# Patient Record
Sex: FEMALE | Race: WHITE | Employment: FULL TIME | URBAN - METROPOLITAN AREA
[De-identification: names, ages, dates, MRNs, and addresses within clinical notes are randomized per-mention and may not be internally consistent; named-entity substitution may affect disease eponyms.]

---

## 2024-01-25 ENCOUNTER — HOSPITAL ENCOUNTER (EMERGENCY)
Age: 19
Discharge: HOME | End: 2024-01-25
Attending: EMERGENCY MEDICINE
Payer: COMMERCIAL

## 2024-01-25 ENCOUNTER — APPOINTMENT (OUTPATIENT)
Dept: CT IMAGING | Age: 19
End: 2024-01-25
Payer: COMMERCIAL

## 2024-01-25 VITALS
SYSTOLIC BLOOD PRESSURE: 119 MMHG | OXYGEN SATURATION: 100 % | HEIGHT: 68 IN | DIASTOLIC BLOOD PRESSURE: 81 MMHG | WEIGHT: 135 LBS | TEMPERATURE: 98.8 F | BODY MASS INDEX: 20.46 KG/M2 | HEART RATE: 77 BPM | RESPIRATION RATE: 18 BRPM

## 2024-01-25 DIAGNOSIS — R10.84 GENERALIZED ABDOMINAL PAIN: Primary | ICD-10-CM

## 2024-01-25 LAB
ALBUMIN SERPL-MCNC: 4.6 G/DL (ref 3.5–5.2)
ALBUMIN/GLOB SERPL: 1.7 {RATIO} (ref 1–2.5)
ALP SERPL-CCNC: 71 U/L (ref 35–104)
ALT SERPL-CCNC: 8 U/L (ref 5–33)
ANION GAP SERPL CALCULATED.3IONS-SCNC: 7 MMOL/L (ref 9–17)
AST SERPL-CCNC: 15 U/L
BACTERIA URNS QL MICRO: ABNORMAL
BASOPHILS # BLD: 0 K/UL (ref 0–0.2)
BASOPHILS NFR BLD: 1 % (ref 0–2)
BILIRUB DIRECT SERPL-MCNC: <0.1 MG/DL
BILIRUB INDIRECT SERPL-MCNC: NORMAL MG/DL (ref 0–1)
BILIRUB SERPL-MCNC: 0.3 MG/DL (ref 0.3–1.2)
BILIRUB UR QL STRIP: NEGATIVE
BUN SERPL-MCNC: 16 MG/DL (ref 6–20)
CALCIUM SERPL-MCNC: 9.3 MG/DL (ref 8.6–10.4)
CHARACTER UR: ABNORMAL
CHLORIDE SERPL-SCNC: 107 MMOL/L (ref 98–107)
CLARITY UR: CLEAR
CO2 SERPL-SCNC: 25 MMOL/L (ref 20–31)
COLOR UR: YELLOW
CREAT SERPL-MCNC: 0.8 MG/DL (ref 0.5–0.9)
EOSINOPHIL # BLD: 0.2 K/UL (ref 0–0.4)
EOSINOPHILS RELATIVE PERCENT: 2 % (ref 1–4)
EPI CELLS #/AREA URNS HPF: ABNORMAL /HPF (ref 0–5)
ERYTHROCYTE [DISTWIDTH] IN BLOOD BY AUTOMATED COUNT: 14 % (ref 12.5–15.4)
GFR SERPL CREATININE-BSD FRML MDRD: >60 ML/MIN/1.73M2
GLUCOSE SERPL-MCNC: 77 MG/DL (ref 70–99)
GLUCOSE UR STRIP-MCNC: NEGATIVE MG/DL
HCG SERPL QL: NEGATIVE
HCT VFR BLD AUTO: 38.4 % (ref 36–46)
HGB BLD-MCNC: 12.9 G/DL (ref 12–16)
HGB UR QL STRIP.AUTO: NEGATIVE
KETONES UR STRIP-MCNC: NEGATIVE MG/DL
LEUKOCYTE ESTERASE UR QL STRIP: ABNORMAL
LIPASE SERPL-CCNC: 42 U/L (ref 13–60)
LYMPHOCYTES NFR BLD: 2.5 K/UL (ref 1.2–5.2)
LYMPHOCYTES RELATIVE PERCENT: 36 % (ref 25–45)
MCH RBC QN AUTO: 28.3 PG (ref 26–34)
MCHC RBC AUTO-ENTMCNC: 33.7 G/DL (ref 31–37)
MCV RBC AUTO: 84.1 FL (ref 80–100)
MONOCYTES NFR BLD: 0.5 K/UL (ref 0.1–1.4)
MONOCYTES NFR BLD: 7 % (ref 2–8)
NEUTROPHILS NFR BLD: 54 % (ref 34–64)
NEUTS SEG NFR BLD: 3.7 K/UL (ref 1.8–8)
NITRITE UR QL STRIP: NEGATIVE
PH UR STRIP: 6 [PH] (ref 5–8)
PLATELET # BLD AUTO: 274 K/UL (ref 140–450)
PMV BLD AUTO: 8.6 FL (ref 6–12)
POTASSIUM SERPL-SCNC: 4 MMOL/L (ref 3.7–5.3)
PROT SERPL-MCNC: 7.3 G/DL (ref 6.4–8.3)
PROT UR STRIP-MCNC: NEGATIVE MG/DL
RBC # BLD AUTO: 4.56 M/UL (ref 4–5.2)
RBC #/AREA URNS HPF: ABNORMAL /HPF (ref 0–2)
SODIUM SERPL-SCNC: 139 MMOL/L (ref 135–144)
SP GR UR STRIP: 1.02 (ref 1–1.03)
UROBILINOGEN UR STRIP-ACNC: NORMAL EU/DL (ref 0–1)
WBC #/AREA URNS HPF: ABNORMAL /HPF (ref 0–5)
WBC OTHER # BLD: 6.8 K/UL (ref 4.5–13.5)

## 2024-01-25 PROCEDURE — 80076 HEPATIC FUNCTION PANEL: CPT

## 2024-01-25 PROCEDURE — 2580000003 HC RX 258: Performed by: EMERGENCY MEDICINE

## 2024-01-25 PROCEDURE — 99285 EMERGENCY DEPT VISIT HI MDM: CPT | Performed by: EMERGENCY MEDICINE

## 2024-01-25 PROCEDURE — 83690 ASSAY OF LIPASE: CPT

## 2024-01-25 PROCEDURE — 85025 COMPLETE CBC W/AUTO DIFF WBC: CPT

## 2024-01-25 PROCEDURE — 74177 CT ABD & PELVIS W/CONTRAST: CPT

## 2024-01-25 PROCEDURE — 6360000004 HC RX CONTRAST MEDICATION: Performed by: EMERGENCY MEDICINE

## 2024-01-25 PROCEDURE — 80048 BASIC METABOLIC PNL TOTAL CA: CPT

## 2024-01-25 PROCEDURE — 84703 CHORIONIC GONADOTROPIN ASSAY: CPT

## 2024-01-25 PROCEDURE — 87086 URINE CULTURE/COLONY COUNT: CPT

## 2024-01-25 PROCEDURE — 81001 URINALYSIS AUTO W/SCOPE: CPT

## 2024-01-25 RX ORDER — SODIUM CHLORIDE 0.9 % (FLUSH) 0.9 %
5-40 SYRINGE (ML) INJECTION PRN
Status: DISCONTINUED | OUTPATIENT
Start: 2024-01-25 | End: 2024-01-25 | Stop reason: HOSPADM

## 2024-01-25 RX ORDER — 0.9 % SODIUM CHLORIDE 0.9 %
100 INTRAVENOUS SOLUTION INTRAVENOUS ONCE
Status: DISCONTINUED | OUTPATIENT
Start: 2024-01-25 | End: 2024-01-25 | Stop reason: HOSPADM

## 2024-01-25 RX ADMIN — SODIUM CHLORIDE, PRESERVATIVE FREE 10 ML: 5 INJECTION INTRAVENOUS at 03:44

## 2024-01-25 RX ADMIN — Medication 80 ML: at 03:44

## 2024-01-25 RX ADMIN — IOPAMIDOL 75 ML: 755 INJECTION, SOLUTION INTRAVENOUS at 03:44

## 2024-01-25 ASSESSMENT — PAIN SCALES - GENERAL: PAINLEVEL_OUTOF10: 8

## 2024-01-25 ASSESSMENT — PAIN - FUNCTIONAL ASSESSMENT: PAIN_FUNCTIONAL_ASSESSMENT: 0-10

## 2024-01-25 ASSESSMENT — PAIN DESCRIPTION - LOCATION: LOCATION: ABDOMEN

## 2024-01-25 ASSESSMENT — PAIN DESCRIPTION - DESCRIPTORS: DESCRIPTORS: ACHING

## 2024-01-25 ASSESSMENT — PAIN DESCRIPTION - ORIENTATION: ORIENTATION: MID

## 2024-01-25 ASSESSMENT — PAIN DESCRIPTION - PAIN TYPE: TYPE: ACUTE PAIN

## 2024-01-25 NOTE — ED PROVIDER NOTES
Nontender and supple with no nuchal rigidity, full range of motion  PULMONARY: clear to auscultation without wheezes, rhonchi, or rales, normal excursion, no accessory muscle use and no stridor  CARDIOVASCULAR: regular rate, rhythm. Strong radial pulses with intact distal perfusion. Capillary refill <2 seconds.  GASTROINTESTINAL: soft, tenderness to palpation over the left upper quadrant abdomen, no pain or McBurney's point, negative Muller sign, no pulsatile mass, non-distended, no palpable masses, no rebound or guarding   GENITOURINARY: No costovertebral angle tenderness to palpation  MUSCULOSKELETAL: No midline spinal tenderness, step off or deformity. Extremities are otherwise nontender to palpation and nonerythematous. Compartments soft. No peripheral edema.  NEUROLOGIC: alert and oriented x 3, GCS 15, normal mentation and speech. Moves all extremities x 4 without motor or sensory deficit, gait is stable without ataxia  PSYCHIATRIC: normal mood and affect, thought process is clear and linear    DIAGNOSTIC RESULTS     EKG:  None    RADIOLOGY:   CT ABDOMEN PELVIS W IV CONTRAST Additional Contrast? None    Result Date: 1/25/2024  1. No acute intra-abdominal or pelvic abnormality. 2. Areas of cortical atrophy in the left kidney suggestive of sequelae of prior infection or infarction. 3. IUD in place.        US PELVIS COMPLETE NON-OB TRANSABDOMINAL AND TRANSVAGINAL    Result Date: 1/10/2024  US PELVIS COMPLETE NON OB TRANSABDOMINAL AND TRANSVAGINAL  UTERUS: 8.74 x 5.99 x 4.28 cm Anteverted, homogeneous  ENDO: 0.37 cm Homogeneous  RT. OVARY: 3.83 x 2.73 x 2.80 cm Unremarkable  LT. OVARY: 2.79 x 1.99 x 1.91 cm Unremarkable  No pelvic free fluid seen  IUD in proper location       LABS:  Results for orders placed or performed during the hospital encounter of 01/25/24   CBC with Auto Differential   Result Value Ref Range    WBC 6.8 4.5 - 13.5 k/uL    RBC 4.56 4.0 - 5.2 m/uL    Hemoglobin 12.9 12.0 - 16.0 g/dL     Urinalysis   Result Value Ref Range    WBC, UA 10 TO 20 0 - 5 /HPF    RBC, UA 0 TO 2 0 - 2 /HPF    Epithelial Cells UA 5 TO 10 0 - 5 /HPF    Bacteria, UA MODERATE (A) None    Other Observations UA Culture ordered based on defined criteria. (A) NOT REQ.       EMERGENCY DEPARTMENT COURSE:        The patient was given the following medications:  Orders Placed This Encounter   Medications    sodium chloride 0.9 % bolus 100 mL    sodium chloride flush 0.9 % injection 5-40 mL    iopamidol (ISOVUE-370) 76 % injection 75 mL        Vitals:    Vitals:    01/25/24 0216   BP: 119/81   Pulse: 77   Resp: 18   Temp: 98.8 °F (37.1 °C)   TempSrc: Oral   SpO2: 100%   Weight: 61.2 kg (135 lb)   Height: 1.727 m (5' 8\")     -------------------------  BP: 119/81, Temp: 98.8 °F (37.1 °C), Pulse: 77, Respirations: 18    CONSULTS:  None    CRITICAL CARE:   None    PROCEDURES:  None    DIAGNOSIS/ MDM:   Magdalena Andre is a 18 y.o. female who presents with generalized abdominal pain with increased pain to the left upper quadrant that has been ongoing for 9 days.  Vital signs are stable.  Abdomen soft with mild left upper quadrant abdominal tenderness on exam.  Exam is otherwise unremarkable.  She declines any pain or nausea medications in the ER.  The patient had laboratory studies done last night at [facility] which were unremarkable.  CBC, BMP, LFTs, lipase, hCG, and urinalysis are unremarkable except for small leukocytes.  I ordered a urine culture.  CT abdomen pelvis shows no acute intra abdominal or pelvic abnormality.  There are areas of cortical atrophy in the left kidney suggestive of sequela of prior infection.  IUD is in place.  She had a pelvic ultrasound done earlier this month which was unremarkable.  I updated the patient on all results.  I suspect she likely has a gastroenteritis as previously suspected.  I explained that the best way to evaluate for this is with a EGD.  I explained that this would need to be scheduled outpatient.

## 2024-01-26 LAB
MICROORGANISM SPEC CULT: NORMAL
SPECIMEN DESCRIPTION: NORMAL

## 2024-02-20 ENCOUNTER — OFFICE VISIT (OUTPATIENT)
Dept: OBGYN CLINIC | Age: 19
End: 2024-02-20
Payer: COMMERCIAL

## 2024-02-20 VITALS
BODY MASS INDEX: 20.31 KG/M2 | WEIGHT: 134 LBS | SYSTOLIC BLOOD PRESSURE: 112 MMHG | HEIGHT: 68 IN | DIASTOLIC BLOOD PRESSURE: 74 MMHG

## 2024-02-20 DIAGNOSIS — Z30.431 INTRAUTERINE DEVICE SURVEILLANCE: Primary | ICD-10-CM

## 2024-02-20 PROCEDURE — G8427 DOCREV CUR MEDS BY ELIG CLIN: HCPCS

## 2024-02-20 PROCEDURE — 1036F TOBACCO NON-USER: CPT

## 2024-02-20 PROCEDURE — G8420 CALC BMI NORM PARAMETERS: HCPCS

## 2024-02-20 PROCEDURE — 99214 OFFICE O/P EST MOD 30 MIN: CPT

## 2024-02-20 PROCEDURE — G8484 FLU IMMUNIZE NO ADMIN: HCPCS

## 2024-02-20 ASSESSMENT — ENCOUNTER SYMPTOMS
RESPIRATORY NEGATIVE: 1
GASTROINTESTINAL NEGATIVE: 1

## 2024-02-20 NOTE — PROGRESS NOTES
Subjective:  Chief Complaint   Patient presents with    Procedure     HPI:  Magdalena Andre is a 18 y.o. female who arrives to office as an established patient.    aMgdalena originally came to have her Mirena IUD removed. She reports that she has occasional cramping but it's very mild and intermittent. She reports that her  has been encouraging her to have it removed. She does not desire a pregnancy at all, other forms of birth control do not work well for her and she is not comfortable tracking her cycles/NFP.    Review of Systems   Constitutional: Negative.    Respiratory: Negative.     Cardiovascular: Negative.    Gastrointestinal: Negative.    Endocrine: Negative.    Genitourinary: Negative.    Skin: Negative.    Neurological: Negative.    Psychiatric/Behavioral: Negative.         Objective:  Vitals:    02/20/24 1304   BP: 112/74   Weight: 60.8 kg (134 lb)   Height: 1.727 m (5' 8\")      Body mass index is 20.37 kg/m².  No LMP recorded. (Menstrual status: IUD).  Current Outpatient Medications on File Prior to Visit   Medication Sig Dispense Refill    norgestimate-ethinyl estradiol (ORTHO-CYCLEN, 28,) 0.25-35 MG-MCG per tablet Take 1 tablet by mouth daily (Patient not taking: Reported on 6/13/2023) 1 packet 0     Current Facility-Administered Medications on File Prior to Visit   Medication Dose Route Frequency Provider Last Rate Last Admin    levonorgestrel (MIRENA) IUD 52 mg 1 each  1 each IntraUTERine Once Toya Means APRN - CNM   1 each at 04/06/23 1622      Past Medical History:   Diagnosis Date    Autism spectrum disorder     Mirena IUD placed 4/6/23 4/6/2023       Patient has never had a Pap test.       Physical Exam  Constitutional:       Appearance: Normal appearance.   HENT:      Head: Normocephalic.   Cardiovascular:      Rate and Rhythm: Normal rate and regular rhythm.      Heart sounds: Normal heart sounds.   Pulmonary:      Effort: Pulmonary effort is normal.      Breath sounds: Normal breath

## 2024-03-18 ENCOUNTER — TELEPHONE (OUTPATIENT)
Dept: OBGYN CLINIC | Age: 19
End: 2024-03-18

## 2024-03-18 NOTE — TELEPHONE ENCOUNTER
Pt called saying she had a positive pregnancy test. When asked the first day of her last period she said \"its been a couple of months\" so I sdaid do you know how far along you are she said\" 5 days. I talked to fantasma and agreed to put her in for missed menses w/o U/S.

## 2024-07-01 ENCOUNTER — PROCEDURE VISIT (OUTPATIENT)
Dept: OBGYN CLINIC | Age: 19
End: 2024-07-01
Payer: OTHER GOVERNMENT

## 2024-07-01 ENCOUNTER — OFFICE VISIT (OUTPATIENT)
Dept: OBGYN CLINIC | Age: 19
End: 2024-07-01
Payer: OTHER GOVERNMENT

## 2024-07-01 ENCOUNTER — TELEPHONE (OUTPATIENT)
Dept: OBGYN CLINIC | Age: 19
End: 2024-07-01

## 2024-07-01 ENCOUNTER — HOSPITAL ENCOUNTER (OUTPATIENT)
Age: 19
Setting detail: SPECIMEN
Discharge: HOME | End: 2024-07-01

## 2024-07-01 VITALS
HEART RATE: 71 BPM | WEIGHT: 143.6 LBS | SYSTOLIC BLOOD PRESSURE: 100 MMHG | DIASTOLIC BLOOD PRESSURE: 64 MMHG | BODY MASS INDEX: 21.83 KG/M2

## 2024-07-01 DIAGNOSIS — R10.2 PELVIC CRAMPING: ICD-10-CM

## 2024-07-01 DIAGNOSIS — R82.998 LEUKOCYTES IN URINE: ICD-10-CM

## 2024-07-01 DIAGNOSIS — Z30.431 IUD CHECK UP: ICD-10-CM

## 2024-07-01 DIAGNOSIS — N89.8 VAGINAL DISCHARGE: ICD-10-CM

## 2024-07-01 DIAGNOSIS — N92.6 IRREGULAR UTERINE BLEEDING: ICD-10-CM

## 2024-07-01 DIAGNOSIS — R10.2 PELVIC CRAMPING: Primary | ICD-10-CM

## 2024-07-01 DIAGNOSIS — T83.32XD INTRAUTERINE CONTRACEPTIVE DEVICE THREADS LOST, SUBSEQUENT ENCOUNTER: ICD-10-CM

## 2024-07-01 LAB
BACTERIA URNS QL MICRO: ABNORMAL
BILIRUB UR QL STRIP: NEGATIVE
BILIRUBIN, POC: ABNORMAL
BLOOD URINE, POC: ABNORMAL
CASTS #/AREA URNS LPF: ABNORMAL /LPF (ref 0–8)
CLARITY UR: ABNORMAL
CLARITY, POC: ABNORMAL
COLOR UR: YELLOW
COLOR, POC: YELLOW
EPI CELLS #/AREA URNS HPF: ABNORMAL /HPF (ref 0–5)
GLUCOSE UR STRIP-MCNC: NEGATIVE MG/DL
GLUCOSE URINE, POC: ABNORMAL
HGB UR QL STRIP.AUTO: ABNORMAL
KETONES UR STRIP-MCNC: NEGATIVE MG/DL
KETONES, POC: ABNORMAL
LEUKOCYTE EST, POC: ABNORMAL
LEUKOCYTE ESTERASE UR QL STRIP: ABNORMAL
NITRITE UR QL STRIP: NEGATIVE
NITRITE, POC: ABNORMAL
PH UR STRIP: 6 [PH] (ref 5–8)
PH, POC: 5.5
PROT UR STRIP-MCNC: NEGATIVE MG/DL
PROTEIN, POC: ABNORMAL
RBC #/AREA URNS HPF: ABNORMAL /HPF (ref 0–4)
SP GR UR STRIP: 1.02 (ref 1–1.03)
SPECIFIC GRAVITY, POC: 1030
UROBILINOGEN UR STRIP-ACNC: NORMAL EU/DL (ref 0–1)
UROBILINOGEN, POC: ABNORMAL
WBC #/AREA URNS HPF: ABNORMAL /HPF (ref 0–5)

## 2024-07-01 PROCEDURE — 76856 US EXAM PELVIC COMPLETE: CPT | Performed by: STUDENT IN AN ORGANIZED HEALTH CARE EDUCATION/TRAINING PROGRAM

## 2024-07-01 PROCEDURE — 81003 URINALYSIS AUTO W/O SCOPE: CPT | Performed by: ADVANCED PRACTICE MIDWIFE

## 2024-07-01 PROCEDURE — 99214 OFFICE O/P EST MOD 30 MIN: CPT | Performed by: ADVANCED PRACTICE MIDWIFE

## 2024-07-01 PROCEDURE — 76830 TRANSVAGINAL US NON-OB: CPT | Performed by: STUDENT IN AN ORGANIZED HEALTH CARE EDUCATION/TRAINING PROGRAM

## 2024-07-01 ASSESSMENT — ENCOUNTER SYMPTOMS
DIARRHEA: 0
RESPIRATORY NEGATIVE: 1
EYES NEGATIVE: 1
ALLERGIC/IMMUNOLOGIC NEGATIVE: 1
VOMITING: 0
CONSTIPATION: 0
NAUSEA: 0
ABDOMINAL PAIN: 0
GASTROINTESTINAL NEGATIVE: 1

## 2024-07-01 NOTE — PROGRESS NOTES
Pt states here for a Possible infection, odor & little bleeding with wiping   Some discharge before bleeding started   No itching   Nut odor present   Sx present for 2 days   No urinary difficulty     Pt states family hx cervical cancer    Body Location Override (Optional - Billing Will Still Be Based On Selected Body Map Location If Applicable): left posterior neck

## 2024-07-01 NOTE — TELEPHONE ENCOUNTER
Writer retrieved Keep[employer] message, nelliem asking for a return call to schedule and appt. Also replied to pts [employer] message

## 2024-07-01 NOTE — PROGRESS NOTES
US PELVIS COMPLETE NON OB TRANSABDOMINAL AND TRANSVAGINAL    UTERUS: 9.04 x 5.24 x 4.79 cm  Homogeneous, Retroverted at start of exam and anteverted by the end    ENDO: 0.43 cm  Fluid seen within endo ?blood    RT. OVARY: 3.80 x 2.01 x 2.01 cm  Unremarkable    LT. OVARY: 2.92 x 2.55 x 2.26 cm  Simple cyst: 2.21 x 1.47 x 2.13 cm    Trace pelvic free fluid seen in cul-de-sac    IUD in proper location

## 2024-07-01 NOTE — PROGRESS NOTES
Subjective:  Chief Complaint   Patient presents with    Vaginal Bleeding    Vaginal Discharge     HPI:  Magdalena Andre is a 18 y.o. female who arrives to office as an established patient for pelvic and vaginal concerns.     Magdalena reports she has been having intercourse with the same male partner since March. Friday night they had IC and now Saturday AM she noticed red bleeding that has turned brown. She has been experiencing pelvic cramping that is not normal for her.   She reports her vaginal opening is also painful. Does admit to rough intercourse.     She has a Mirena IUD in place, strings not visible on exam 1/2024 and placement was confirmed by ultrasound following exam.     Patient's last menstrual period was 06/17/2024. Reports that period stopped 6/24/24 so this current vaginal bleeding was not expected.       Review of Systems   Constitutional: Negative.  Negative for chills, fatigue and fever.   HENT: Negative.     Eyes: Negative.    Respiratory: Negative.     Cardiovascular: Negative.    Gastrointestinal: Negative.  Negative for abdominal pain, constipation, diarrhea, nausea and vomiting.   Endocrine: Negative.    Genitourinary:  Positive for menstrual problem, pelvic pain, vaginal discharge and vaginal pain. Negative for dysuria and frequency.   Musculoskeletal: Negative.    Skin: Negative.    Allergic/Immunologic: Negative.    Neurological: Negative.    Hematological: Negative.    Psychiatric/Behavioral: Negative.         Objective:  Vitals:    07/01/24 1335   BP: 100/64   Pulse: 71   Weight: 65.1 kg (143 lb 9.6 oz)      Body mass index is 21.83 kg/m².  Patient's last menstrual period was 06/17/2024.  No current outpatient medications on file prior to visit.     Current Facility-Administered Medications on File Prior to Visit   Medication Dose Route Frequency Provider Last Rate Last Admin    levonorgestrel (MIRENA) IUD 52 mg 1 each  1 each IntraUTERine Once Toya Means, BRIAN - KYLEM   1 each at

## 2024-07-02 DIAGNOSIS — B96.89 BV (BACTERIAL VAGINOSIS): Primary | ICD-10-CM

## 2024-07-02 DIAGNOSIS — N76.0 BV (BACTERIAL VAGINOSIS): Primary | ICD-10-CM

## 2024-07-02 LAB
C TRACH DNA SPEC QL PROBE+SIG AMP: NEGATIVE
CANDIDA SPECIES: NEGATIVE
GARDNERELLA VAGINALIS: POSITIVE
MICROORGANISM SPEC CULT: NORMAL
N GONORRHOEA DNA SPEC QL PROBE+SIG AMP: NEGATIVE
SERVICE CMNT-IMP: NORMAL
SOURCE: ABNORMAL
SPECIMEN DESCRIPTION: NORMAL
SPECIMEN DESCRIPTION: NORMAL
TRICHOMONAS: NEGATIVE

## 2024-07-02 RX ORDER — METRONIDAZOLE 7.5 MG/G
1 GEL VAGINAL NIGHTLY
Qty: 70 G | Refills: 0 | Status: SHIPPED | OUTPATIENT
Start: 2024-07-02 | End: 2024-07-07

## 2024-09-09 ENCOUNTER — OFFICE VISIT (OUTPATIENT)
Dept: OBGYN CLINIC | Age: 19
End: 2024-09-09
Payer: OTHER GOVERNMENT

## 2024-09-09 ENCOUNTER — HOSPITAL ENCOUNTER (OUTPATIENT)
Age: 19
Setting detail: SPECIMEN
Discharge: HOME | End: 2024-09-09

## 2024-09-09 VITALS
HEIGHT: 67 IN | SYSTOLIC BLOOD PRESSURE: 100 MMHG | DIASTOLIC BLOOD PRESSURE: 60 MMHG | WEIGHT: 137 LBS | BODY MASS INDEX: 21.5 KG/M2

## 2024-09-09 DIAGNOSIS — Z01.419 ENCOUNTER FOR ANNUAL ROUTINE GYNECOLOGICAL EXAMINATION: Primary | ICD-10-CM

## 2024-09-09 DIAGNOSIS — Z80.41 FAMILY HISTORY OF OVARIAN CANCER: ICD-10-CM

## 2024-09-09 PROCEDURE — 99395 PREV VISIT EST AGE 18-39: CPT | Performed by: CLINICAL NURSE SPECIALIST

## 2024-09-09 SDOH — ECONOMIC STABILITY: FOOD INSECURITY: WITHIN THE PAST 12 MONTHS, THE FOOD YOU BOUGHT JUST DIDN'T LAST AND YOU DIDN'T HAVE MONEY TO GET MORE.: NEVER TRUE

## 2024-09-09 SDOH — ECONOMIC STABILITY: FOOD INSECURITY: WITHIN THE PAST 12 MONTHS, YOU WORRIED THAT YOUR FOOD WOULD RUN OUT BEFORE YOU GOT MONEY TO BUY MORE.: NEVER TRUE

## 2024-09-09 SDOH — ECONOMIC STABILITY: INCOME INSECURITY: HOW HARD IS IT FOR YOU TO PAY FOR THE VERY BASICS LIKE FOOD, HOUSING, MEDICAL CARE, AND HEATING?: NOT HARD AT ALL

## 2024-09-09 ASSESSMENT — PATIENT HEALTH QUESTIONNAIRE - PHQ9
1. LITTLE INTEREST OR PLEASURE IN DOING THINGS: NOT AT ALL
SUM OF ALL RESPONSES TO PHQ QUESTIONS 1-9: 0
SUM OF ALL RESPONSES TO PHQ QUESTIONS 1-9: 0
2. FEELING DOWN, DEPRESSED OR HOPELESS: NOT AT ALL
SUM OF ALL RESPONSES TO PHQ QUESTIONS 1-9: 0
SUM OF ALL RESPONSES TO PHQ9 QUESTIONS 1 & 2: 0
SUM OF ALL RESPONSES TO PHQ QUESTIONS 1-9: 0

## 2024-09-09 ASSESSMENT — ANXIETY QUESTIONNAIRES
6. BECOMING EASILY ANNOYED OR IRRITABLE: NOT AT ALL
5. BEING SO RESTLESS THAT IT IS HARD TO SIT STILL: NOT AT ALL
7. FEELING AFRAID AS IF SOMETHING AWFUL MIGHT HAPPEN: NOT AT ALL
3. WORRYING TOO MUCH ABOUT DIFFERENT THINGS: NOT AT ALL
1. FEELING NERVOUS, ANXIOUS, OR ON EDGE: NOT AT ALL
GAD7 TOTAL SCORE: 0
2. NOT BEING ABLE TO STOP OR CONTROL WORRYING: NOT AT ALL
4. TROUBLE RELAXING: NOT AT ALL

## 2024-09-15 ENCOUNTER — APPOINTMENT (OUTPATIENT)
Dept: GENERAL RADIOLOGY | Facility: CLINIC | Age: 19
End: 2024-09-15
Payer: OTHER GOVERNMENT

## 2024-09-15 ENCOUNTER — HOSPITAL ENCOUNTER (EMERGENCY)
Facility: CLINIC | Age: 19
Discharge: HOME | End: 2024-09-16
Attending: SPECIALIST
Payer: OTHER GOVERNMENT

## 2024-09-15 VITALS
SYSTOLIC BLOOD PRESSURE: 114 MMHG | BODY MASS INDEX: 21.19 KG/M2 | RESPIRATION RATE: 14 BRPM | DIASTOLIC BLOOD PRESSURE: 77 MMHG | TEMPERATURE: 98.3 F | HEART RATE: 74 BPM | HEIGHT: 67 IN | WEIGHT: 135 LBS | OXYGEN SATURATION: 98 %

## 2024-09-15 DIAGNOSIS — S50.11XA CONTUSION OF RIGHT FOREARM, INITIAL ENCOUNTER: Primary | ICD-10-CM

## 2024-09-15 PROCEDURE — 73090 X-RAY EXAM OF FOREARM: CPT

## 2024-09-15 PROCEDURE — 99283 EMERGENCY DEPT VISIT LOW MDM: CPT

## 2024-09-15 PROCEDURE — 6370000000 HC RX 637 (ALT 250 FOR IP): Performed by: SPECIALIST

## 2024-09-15 RX ORDER — ACETAMINOPHEN 325 MG/1
650 TABLET ORAL ONCE
Status: COMPLETED | OUTPATIENT
Start: 2024-09-16 | End: 2024-09-15

## 2024-09-15 RX ADMIN — ACETAMINOPHEN 650 MG: 325 TABLET ORAL at 23:39

## 2024-09-15 ASSESSMENT — PAIN SCALES - GENERAL: PAINLEVEL_OUTOF10: 8

## 2024-09-15 ASSESSMENT — PAIN DESCRIPTION - DESCRIPTORS: DESCRIPTORS: SHOOTING;DISCOMFORT

## 2024-09-15 ASSESSMENT — PAIN DESCRIPTION - LOCATION: LOCATION: WRIST

## 2024-09-15 ASSESSMENT — PAIN - FUNCTIONAL ASSESSMENT: PAIN_FUNCTIONAL_ASSESSMENT: 0-10

## 2024-09-15 ASSESSMENT — PAIN DESCRIPTION - ORIENTATION: ORIENTATION: RIGHT

## 2024-09-16 ASSESSMENT — ENCOUNTER SYMPTOMS
SORE THROAT: 0
ABDOMINAL PAIN: 0
COUGH: 0
NAUSEA: 0

## 2024-09-16 ASSESSMENT — PAIN - FUNCTIONAL ASSESSMENT: PAIN_FUNCTIONAL_ASSESSMENT: 0-10

## 2024-09-18 ENCOUNTER — TELEPHONE (OUTPATIENT)
Dept: OBGYN CLINIC | Age: 19
End: 2024-09-18

## 2024-09-18 LAB — CYTOLOGY REPORT: NORMAL

## 2024-09-23 ENCOUNTER — HOSPITAL ENCOUNTER (EMERGENCY)
Age: 19
Discharge: HOME | End: 2024-09-24
Attending: EMERGENCY MEDICINE
Payer: OTHER GOVERNMENT

## 2024-09-23 ENCOUNTER — APPOINTMENT (OUTPATIENT)
Dept: CT IMAGING | Age: 19
End: 2024-09-23
Payer: OTHER GOVERNMENT

## 2024-09-23 ENCOUNTER — HOSPITAL ENCOUNTER (OUTPATIENT)
Age: 19
Discharge: HOME | End: 2024-09-23
Payer: OTHER GOVERNMENT

## 2024-09-23 VITALS
RESPIRATION RATE: 14 BRPM | HEART RATE: 85 BPM | BODY MASS INDEX: 22.6 KG/M2 | SYSTOLIC BLOOD PRESSURE: 112 MMHG | WEIGHT: 144 LBS | TEMPERATURE: 98 F | OXYGEN SATURATION: 100 % | DIASTOLIC BLOOD PRESSURE: 71 MMHG | HEIGHT: 67 IN

## 2024-09-23 DIAGNOSIS — R51.9 ACUTE NONINTRACTABLE HEADACHE, UNSPECIFIED HEADACHE TYPE: Primary | ICD-10-CM

## 2024-09-23 PROBLEM — Z80.41 FAMILY HISTORY OF OVARIAN CANCER: Status: ACTIVE | Noted: 2024-09-23

## 2024-09-23 PROBLEM — Z80.3 FAMILY HISTORY OF BREAST CANCER: Status: ACTIVE | Noted: 2024-09-23

## 2024-09-23 LAB
25(OH)D3 SERPL-MCNC: 30.3 NG/ML (ref 30–100)
ALBUMIN SERPL-MCNC: 4.9 G/DL (ref 3.5–5.2)
ALBUMIN/GLOB SERPL: 2 {RATIO} (ref 1–2.5)
ALP SERPL-CCNC: 49 U/L (ref 35–104)
ALT SERPL-CCNC: 11 U/L (ref 10–35)
ANION GAP SERPL CALCULATED.3IONS-SCNC: 11 MMOL/L (ref 9–16)
AST SERPL-CCNC: 18 U/L (ref 10–35)
BILIRUB SERPL-MCNC: 0.6 MG/DL (ref 0–1.2)
BUN SERPL-MCNC: 13 MG/DL (ref 6–20)
CALCIUM SERPL-MCNC: 9.5 MG/DL (ref 8.6–10.4)
CHLORIDE SERPL-SCNC: 105 MMOL/L (ref 98–107)
CHOLEST SERPL-MCNC: 141 MG/DL (ref 0–199)
CHOLESTEROL/HDL RATIO: 2
CO2 SERPL-SCNC: 23 MMOL/L (ref 20–31)
CREAT SERPL-MCNC: 0.9 MG/DL (ref 0.5–0.9)
ERYTHROCYTE [DISTWIDTH] IN BLOOD BY AUTOMATED COUNT: 12.8 % (ref 11.8–14.4)
EST. AVERAGE GLUCOSE BLD GHB EST-MCNC: 82 MG/DL
GFR, ESTIMATED: 90 ML/MIN/1.73M2
GLUCOSE SERPL-MCNC: 79 MG/DL (ref 74–99)
HBA1C MFR BLD: 4.5 % (ref 4–6)
HCG SERPL QL: NEGATIVE
HCT VFR BLD AUTO: 39.1 % (ref 36.3–47.1)
HDLC SERPL-MCNC: 58 MG/DL
HGB BLD-MCNC: 12.8 G/DL (ref 11.9–15.1)
IRON SERPL-MCNC: 136 UG/DL (ref 37–145)
LDLC SERPL CALC-MCNC: 72 MG/DL (ref 0–100)
MAGNESIUM SERPL-MCNC: 2.1 MG/DL (ref 1.6–2.6)
MCH RBC QN AUTO: 28.6 PG (ref 25.2–33.5)
MCHC RBC AUTO-ENTMCNC: 32.7 G/DL (ref 28.4–34.8)
MCV RBC AUTO: 87.5 FL (ref 82.6–102.9)
NRBC BLD-RTO: 0 PER 100 WBC
PLATELET # BLD AUTO: 271 K/UL (ref 138–453)
PMV BLD AUTO: 11 FL (ref 8.1–13.5)
POTASSIUM SERPL-SCNC: 4.2 MMOL/L (ref 3.7–5.3)
PROT SERPL-MCNC: 7.1 G/DL (ref 6.6–8.7)
RBC # BLD AUTO: 4.47 M/UL (ref 3.95–5.11)
SODIUM SERPL-SCNC: 139 MMOL/L (ref 136–145)
T3 SERPL-MCNC: 112 NG/DL (ref 80–200)
T4 SERPL-MCNC: 7.4 UG/DL (ref 4.5–11.7)
TRIGL SERPL-MCNC: 56 MG/DL
TSH SERPL DL<=0.05 MIU/L-ACNC: 1.99 UIU/ML (ref 0.27–4.2)
VLDLC SERPL CALC-MCNC: 11 MG/DL
WBC OTHER # BLD: 5.5 K/UL (ref 4.5–13.5)

## 2024-09-23 PROCEDURE — 2580000003 HC RX 258: Performed by: EMERGENCY MEDICINE

## 2024-09-23 PROCEDURE — 85027 COMPLETE CBC AUTOMATED: CPT

## 2024-09-23 PROCEDURE — 70450 CT HEAD/BRAIN W/O DYE: CPT

## 2024-09-23 PROCEDURE — 84436 ASSAY OF TOTAL THYROXINE: CPT

## 2024-09-23 PROCEDURE — 84443 ASSAY THYROID STIM HORMONE: CPT

## 2024-09-23 PROCEDURE — 84703 CHORIONIC GONADOTROPIN ASSAY: CPT

## 2024-09-23 PROCEDURE — 83036 HEMOGLOBIN GLYCOSYLATED A1C: CPT

## 2024-09-23 PROCEDURE — 82306 VITAMIN D 25 HYDROXY: CPT

## 2024-09-23 PROCEDURE — 36415 COLL VENOUS BLD VENIPUNCTURE: CPT

## 2024-09-23 PROCEDURE — 80061 LIPID PANEL: CPT

## 2024-09-23 PROCEDURE — 96365 THER/PROPH/DIAG IV INF INIT: CPT

## 2024-09-23 PROCEDURE — 6360000002 HC RX W HCPCS: Performed by: EMERGENCY MEDICINE

## 2024-09-23 PROCEDURE — 83735 ASSAY OF MAGNESIUM: CPT

## 2024-09-23 PROCEDURE — 83540 ASSAY OF IRON: CPT

## 2024-09-23 PROCEDURE — 84480 ASSAY TRIIODOTHYRONINE (T3): CPT

## 2024-09-23 PROCEDURE — 99284 EMERGENCY DEPT VISIT MOD MDM: CPT

## 2024-09-23 PROCEDURE — 80053 COMPREHEN METABOLIC PANEL: CPT

## 2024-09-23 RX ORDER — ONDANSETRON 2 MG/ML
4 INJECTION INTRAMUSCULAR; INTRAVENOUS ONCE
Status: COMPLETED | OUTPATIENT
Start: 2024-09-23 | End: 2024-09-24

## 2024-09-23 RX ORDER — 0.9 % SODIUM CHLORIDE 0.9 %
1000 INTRAVENOUS SOLUTION INTRAVENOUS ONCE
Status: COMPLETED | OUTPATIENT
Start: 2024-09-23 | End: 2024-09-24

## 2024-09-23 RX ORDER — MAGNESIUM SULFATE 1 G/100ML
1000 INJECTION INTRAVENOUS ONCE
Status: COMPLETED | OUTPATIENT
Start: 2024-09-23 | End: 2024-09-24

## 2024-09-23 RX ORDER — KETOROLAC TROMETHAMINE 30 MG/ML
30 INJECTION, SOLUTION INTRAMUSCULAR; INTRAVENOUS ONCE
Status: COMPLETED | OUTPATIENT
Start: 2024-09-23 | End: 2024-09-24

## 2024-09-23 RX ADMIN — SODIUM CHLORIDE 1000 ML: 9 INJECTION, SOLUTION INTRAVENOUS at 23:13

## 2024-09-23 RX ADMIN — MAGNESIUM SULFATE HEPTAHYDRATE 1000 MG: 1 INJECTION, SOLUTION INTRAVENOUS at 23:14

## 2024-09-23 ASSESSMENT — ENCOUNTER SYMPTOMS
EYE PAIN: 0
EYE DISCHARGE: 0
WHEEZING: 0
DIARRHEA: 0
VOMITING: 0
NAUSEA: 1
CONSTIPATION: 0
PHOTOPHOBIA: 1
STRIDOR: 0
SORE THROAT: 0
ABDOMINAL PAIN: 0
COUGH: 0
SHORTNESS OF BREATH: 0
COLOR CHANGE: 0
EYE REDNESS: 0

## 2024-09-23 ASSESSMENT — PAIN - FUNCTIONAL ASSESSMENT: PAIN_FUNCTIONAL_ASSESSMENT: 0-10

## 2024-09-23 ASSESSMENT — PAIN DESCRIPTION - LOCATION: LOCATION: HEAD

## 2024-09-23 ASSESSMENT — PAIN SCALES - GENERAL: PAINLEVEL_OUTOF10: 8

## 2024-09-24 PROCEDURE — 6360000002 HC RX W HCPCS: Performed by: EMERGENCY MEDICINE

## 2024-09-24 PROCEDURE — 96375 TX/PRO/DX INJ NEW DRUG ADDON: CPT

## 2024-09-24 RX ADMIN — ONDANSETRON 4 MG: 2 INJECTION INTRAMUSCULAR; INTRAVENOUS at 00:08

## 2024-09-24 RX ADMIN — KETOROLAC TROMETHAMINE 30 MG: 30 INJECTION, SOLUTION INTRAMUSCULAR at 00:08

## 2024-09-24 NOTE — ED NOTES
Mode of arrival (squad #, walk in, police, etc) : walk in        Chief complaint(s): H/A        Arrival Note (brief scenario, treatment PTA, etc).: per pt, c/o intermittent H/A, onset approx 2 wks. Taking Motrin w/o relief. Rates head pain 8/10. States intermittent blurred vision. Denies N/V. VSS, afebrile.         C= \"Have you ever felt that you should Cut down on your drinking?\"  No  A= \"Have people Annoyed you by criticizing your drinking?\"  No  G= \"Have you ever felt bad or Guilty about your drinking?\"  No  E= \"Have you ever had a drink as an Eye-opener first thing in the morning to steady your nerves or to help a hangover?\"  No      Deferred []      Reason for deferring: N/A    *If yes to two or more: probable alcohol abuse.*      Speech Therapy      Visit Type: Treatment  -  Swallow and communication/cognition    Precautions     - Medical precautions: ; contact precautions.     - Oxygen: room air.      Current Diet: nothing by mouth and PEG tube    SUBJECTIVE  Pt received awake/alert in bed. Throat clearing at baseline. Patient agreed to participate in therapy this date.   Patient/family goals: maximize function     OBJECTIVE      Communication/Cognition:  Orientation Level:      - Person: oriented with cues    - Place: oriented with cues    - Month: not oriented    - Year: oriented    - Date: not oriented    - Day of week: not oriented    - Season: oriented with cues    - Reason for hospitalization: not oriented  Attention:      - Sustained attention: moderate impairment (50-74% accuracy)    - Pt easily distracted, frequent repetitions and reminders required during session for completion of tasks and during casual conversation.     Swallowing     Consistencies:  Thin Liquid (teaspoon):    - Oral: intact   - Pharyngeal: impaired  , delayed throat clear and vocal quality change  Thin Liquid (cup):    - Oral: intact     - Pharyngeal: impaired, immediate cough and vocal quality change  Dysphagia 1/Puree:     - Oral: impaired, slow oral transit and oral residue   - Pharyngeal: impaired, multiple swallows noted and vocal quality change            ASSESSMENT                                                                        • Impairments: swallowing  • Functional Limitations: eating/drinking and communication/cognition  • Skilled therapy is required to address these limitations in attempt to maximize the patient's independence.  • Requires SLP Follow Up: Yes    Patient presents with slowly improving oropharyngeal swallow fx. Pt w/ signs of pharyngeal dysphagia c/b s/sx of aspiration across trials of thin as well as multiple swallows per bolus across all consistencies. Pt with reduced safety with PO due to impaired sustained and selective  attention resulting in reduced attention to bolus as trials progressed. Continue NPO with long term alternate means and therapeutic trials with ST only.    Patient further presents with severe cog-comm deficits demonstrated by reduced insight, poor sustained and selective attention, reduced short term memory, and impulsivity. Pt noted to be impulsive and easily distracted. Continue ongoing cog-comm assessment with dysphagia management. Pt will require 24 hour supervision at discharge.    SLP Identified Barriers to Discharge: Cognition  Recommendations for Discharge: SLP: Intensive therapy  Recommendations for Discharge: SLP IL: Patient is appropriate for intensive Speech Therapy with the oversight of a physical medicine and rehabilitation physician    • Rehab Potential: fair  • Potential barriers to progress:  Cognitive status and impulsivity  • Progress: Progressing toward goals  • Interferring components: cognitive status and processing time    Education Provided:   Education provided during session:  - Results of above outlined education: Verbalizes understanding and Needs reinforcement  Patient at end of session:    - location: in bed    - safety measures: call light within reach    - hand off to: nurse and family/caregiver    PLAN     Interventions:  Assess tolerance of least restrictive oral diet and communication/cognition evaluation    RECOMMENDATIONS     -Diet:          *nothing by mouth    -Additional Swallow Recommendations:         *continue alternative means of nutrition and frequent oral care    -Speech Reviewed Swallow:         *with patient/family    -Communication Cognition:          *Patient demonstrates acute communication and cognition deficits, will initiate speech therapy.      GOALS  Review Date: 5/2/2022  Long Term Goals:   Pt will tolerate least restrictive diet without signs of aspiration.  Pt will participate in ongoing assessment of cognitive-communication skills.    Documented in the  chart in the following areas: Assessment.      Therapy procedure time and total treatment time can be found documented on the Time Entry flowsheet

## 2024-09-24 NOTE — ED PROVIDER NOTES
[facility] EMERGENCY DEPARTMENT  EMERGENCY DEPARTMENT ENCOUNTER      Pt Name: Magdalena Andre  MRN: [id number]  Birthdate 2005  Date of evaluation: 9/23/2024  Provider: Constance Hutton MD    CHIEF COMPLAINT       Chief Complaint   Patient presents with    Headache     HISTORY OF PRESENT ILLNESS  (Location/Symptom, Timing/Onset, Context/Setting, Quality, Duration, Modifying Factors, Severity.)   Magdalena Andre is a 19 y.o. female who presents to the emergency department complaining of headache for several days.  She tells me that it moves around.  Is not 1 specific place.  She tells me that she does not typically get headaches so this is unusual for her.  But it has been an all day thing.  She does relate that she does feel nauseous with that and she has photophobia.  She relates it is hard to describe but she feels this dull weird sensation behind her eyes.  She says she feels a bump on the right side of her scalp.  She says she was able to get into her doctor and was seen.  She did have lab work done but was told it was all normal.  So she is not sure what is going on and why.  She relates the lump is not tender.  She has never had imaging of the brain.  She did take some Motrin earlier today.  She relates it has really not helped at all.    Nursing Notes were reviewed.    REVIEW OF SYSTEMS    (2-9 systems for level 4, 10 or more for level 5)     Review of Systems   Constitutional:  Negative for activity change, appetite change, chills, fatigue and fever.   HENT:  Negative for congestion, ear pain and sore throat.    Eyes:  Positive for photophobia. Negative for pain, discharge and redness.   Respiratory:  Negative for cough, shortness of breath, wheezing and stridor.    Cardiovascular:  Negative for chest pain.   Gastrointestinal:  Positive for nausea. Negative for abdominal pain, constipation, diarrhea and vomiting.   Genitourinary:  Negative for decreased urine volume and difficulty  VICKIE, RAYA  [address]  [place] [address]  [phone]    Call in 1 day      Dr Woodard for headaches  neurology referral  Call in 1 day      Heri Aggarwal MD  [address] [facility] [place] [id number] [phone]    Call in 1 day        DISCHARGE MEDICATIONS:  New Prescriptions    No medications on file       (Please note that portions of this note were completed with a voice recognition program.  Efforts were made to edit the dictations but occasionally words are mis-transcribed.)    Constance Hutton MD  Attending Emergency Physician

## 2024-10-10 NOTE — PROGRESS NOTES
[facility] NEUROLOGY  [address] [address] [address] [address]  Dept: [phone]    PATIENT NAME: Magdalena Andre  PATIENT MRN: [id number]  PRIMARY CARE PHYSICIAN: Hermila Bertrand PA-C    HPI:      Magdalena Andre is a 19 y.o. 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  She was recently seen in the emergency room on 9/23/2024, and presents today to establish care with neurology.    Magdalena Andre reports that she was in her previous state of health, when she developed severe, unrelenting, daily headaches 1 day at the beginning of September 2024.  She reports that her , who is a , had recently left for a prolonged period for work.  This created increased stress, a change in her schedule, and resulted in her drinking large amounts of energy drinks.  She was not able to describe headache character.  She states that the headaches were typically on the right in the parietal region, but moved around as well.  She denied any photophobia or phonophobia.  She did have nausea.  She did not have any vomiting.  She states that she had some increased somnolence and reported that a family member told her she looked pale.  She tried taking ibuprofen, which did not improve her headache.  Headaches became severe and are rated 9 out of 10 in severity.  They did not improve after her sleep schedule became more regulated and she cut back to only drinking 1 energy drink per day.  1 day, she noticed that she had a bump on the back of her head.    Ms. Andre ultimately presented to the ER on 9/23/2024.  She underwent CT head, which was unremarkable.  MRI of the brain was recommended, but was not performed in the ER.  She improved with IV fluids, magnesium, ondansetron, and ketorolac.  She reports that she has a more mild headache of similar character today, but that this is the first time she has

## 2024-10-15 ENCOUNTER — OFFICE VISIT (OUTPATIENT)
Dept: NEUROLOGY | Age: 19
End: 2024-10-15
Payer: OTHER GOVERNMENT

## 2024-10-15 VITALS
DIASTOLIC BLOOD PRESSURE: 64 MMHG | HEART RATE: 87 BPM | HEIGHT: 67 IN | BODY MASS INDEX: 20.65 KG/M2 | WEIGHT: 131.6 LBS | SYSTOLIC BLOOD PRESSURE: 102 MMHG | OXYGEN SATURATION: 98 %

## 2024-10-15 DIAGNOSIS — G44.52 NEW DAILY PERSISTENT HEADACHE: Primary | ICD-10-CM

## 2024-10-15 DIAGNOSIS — G43.009 MIGRAINE WITHOUT AURA AND WITHOUT STATUS MIGRAINOSUS, NOT INTRACTABLE: ICD-10-CM

## 2024-10-15 DIAGNOSIS — R20.0 NUMBNESS AND TINGLING: ICD-10-CM

## 2024-10-15 DIAGNOSIS — R20.2 NUMBNESS AND TINGLING: ICD-10-CM

## 2024-10-15 PROCEDURE — 99204 OFFICE O/P NEW MOD 45 MIN: CPT | Performed by: PSYCHIATRY & NEUROLOGY

## 2024-10-15 RX ORDER — SUMATRIPTAN 50 MG/1
50 TABLET, FILM COATED ORAL SEE ADMIN INSTRUCTIONS
Qty: 9 TABLET | Refills: 11 | Status: SHIPPED | OUTPATIENT
Start: 2024-10-15

## 2024-10-31 ENCOUNTER — HOSPITAL ENCOUNTER (OUTPATIENT)
Dept: MRI IMAGING | Age: 19
Discharge: HOME | End: 2024-10-27
Attending: PSYCHIATRY & NEUROLOGY

## 2024-10-31 DIAGNOSIS — R20.0 NUMBNESS AND TINGLING: ICD-10-CM

## 2024-10-31 DIAGNOSIS — G44.52 NEW DAILY PERSISTENT HEADACHE: ICD-10-CM

## 2024-10-31 DIAGNOSIS — R20.2 NUMBNESS AND TINGLING: ICD-10-CM

## 2024-11-01 ENCOUNTER — HOSPITAL ENCOUNTER (OUTPATIENT)
Dept: MRI IMAGING | Age: 19
Discharge: HOME | End: 2024-11-03
Payer: OTHER GOVERNMENT

## 2024-11-01 PROCEDURE — 6360000004 HC RX CONTRAST MEDICATION: Performed by: PSYCHIATRY & NEUROLOGY

## 2024-11-01 PROCEDURE — 70553 MRI BRAIN STEM W/O & W/DYE: CPT

## 2024-11-01 PROCEDURE — 2580000003 HC RX 258: Performed by: PSYCHIATRY & NEUROLOGY

## 2024-11-01 PROCEDURE — A9579 GAD-BASE MR CONTRAST NOS,1ML: HCPCS | Performed by: PSYCHIATRY & NEUROLOGY

## 2024-11-01 RX ORDER — SODIUM CHLORIDE 0.9 % (FLUSH) 0.9 %
10 SYRINGE (ML) INJECTION PRN
Status: DISCONTINUED | OUTPATIENT
Start: 2024-11-01 | End: 2024-11-04 | Stop reason: HOSPADM

## 2024-11-01 RX ADMIN — SODIUM CHLORIDE, PRESERVATIVE FREE 10 ML: 5 INJECTION INTRAVENOUS at 09:55

## 2024-11-01 RX ADMIN — GADOTERIDOL 12 ML: 279.3 INJECTION, SOLUTION INTRAVENOUS at 09:53

## 2025-02-25 ENCOUNTER — HOSPITAL ENCOUNTER (EMERGENCY)
Facility: CLINIC | Age: 20
Discharge: HOME | End: 2025-02-25
Attending: EMERGENCY MEDICINE
Payer: OTHER GOVERNMENT

## 2025-02-25 ENCOUNTER — APPOINTMENT (OUTPATIENT)
Dept: GENERAL RADIOLOGY | Facility: CLINIC | Age: 20
End: 2025-02-25
Attending: EMERGENCY MEDICINE
Payer: OTHER GOVERNMENT

## 2025-02-25 VITALS
RESPIRATION RATE: 15 BRPM | SYSTOLIC BLOOD PRESSURE: 132 MMHG | DIASTOLIC BLOOD PRESSURE: 72 MMHG | HEIGHT: 67 IN | HEART RATE: 76 BPM | TEMPERATURE: 97.7 F | WEIGHT: 145 LBS | OXYGEN SATURATION: 98 % | BODY MASS INDEX: 22.76 KG/M2

## 2025-02-25 DIAGNOSIS — R07.9 CHEST PAIN, UNSPECIFIED TYPE: Primary | ICD-10-CM

## 2025-02-25 LAB
ALBUMIN SERPL-MCNC: 4.3 G/DL (ref 3.5–5.2)
ALBUMIN/GLOB SERPL: 2 {RATIO}
ALP SERPL-CCNC: 49 U/L (ref 35–104)
ALT SERPL-CCNC: 13 U/L (ref 10–35)
ANION GAP SERPL CALCULATED.3IONS-SCNC: 10 MMOL/L (ref 9–16)
AST SERPL-CCNC: 19 U/L (ref 10–35)
BASOPHILS # BLD: 0 K/UL (ref 0–0.2)
BASOPHILS NFR BLD: 1 % (ref 0–2)
BILIRUB SERPL-MCNC: 0.3 MG/DL (ref 0–1.2)
BUN SERPL-MCNC: 14 MG/DL (ref 6–20)
CALCIUM SERPL-MCNC: 9.4 MG/DL (ref 8.6–10.4)
CHLORIDE SERPL-SCNC: 105 MMOL/L (ref 98–107)
CO2 SERPL-SCNC: 24 MMOL/L (ref 20–31)
CREAT SERPL-MCNC: 1 MG/DL (ref 0.5–0.9)
EKG ATRIAL RATE: 64 BPM
EKG P AXIS: 45 DEGREES
EKG P-R INTERVAL: 118 MS
EKG Q-T INTERVAL: 366 MS
EKG QRS DURATION: 82 MS
EKG QTC CALCULATION (BAZETT): 377 MS
EKG R AXIS: 35 DEGREES
EKG T AXIS: 30 DEGREES
EKG VENTRICULAR RATE: 64 BPM
EOSINOPHIL # BLD: 0.1 K/UL (ref 0–0.4)
EOSINOPHILS RELATIVE PERCENT: 2 % (ref 1–4)
ERYTHROCYTE [DISTWIDTH] IN BLOOD BY AUTOMATED COUNT: 13.1 % (ref 12.5–15.4)
GFR, ESTIMATED: 82 ML/MIN/1.73M2
GLUCOSE SERPL-MCNC: 98 MG/DL (ref 74–99)
HCG SERPL QL: NEGATIVE
HCT VFR BLD AUTO: 34.2 % (ref 36–46)
HGB BLD-MCNC: 12 G/DL (ref 12–16)
LIPASE SERPL-CCNC: 44 U/L (ref 13–60)
LYMPHOCYTES NFR BLD: 2.5 K/UL (ref 1–4.8)
LYMPHOCYTES RELATIVE PERCENT: 41 % (ref 25–45)
MCH RBC QN AUTO: 29.7 PG (ref 26–34)
MCHC RBC AUTO-ENTMCNC: 35.1 G/DL (ref 31–37)
MCV RBC AUTO: 84.8 FL (ref 80–100)
MONOCYTES NFR BLD: 0.4 K/UL (ref 0.1–1.4)
MONOCYTES NFR BLD: 7 % (ref 2–8)
NEUTROPHILS NFR BLD: 49 % (ref 34–64)
NEUTS SEG NFR BLD: 3 K/UL (ref 1.8–7.7)
PLATELET # BLD AUTO: 235 K/UL (ref 140–450)
PMV BLD AUTO: 8.3 FL (ref 6–12)
POTASSIUM SERPL-SCNC: 3.4 MMOL/L (ref 3.7–5.3)
PROT SERPL-MCNC: 6.5 G/DL (ref 6.6–8.7)
RBC # BLD AUTO: 4.03 M/UL (ref 4–5.2)
SODIUM SERPL-SCNC: 139 MMOL/L (ref 136–145)
TROPONIN I SERPL HS-MCNC: <6 NG/L (ref 0–14)
WBC OTHER # BLD: 6.1 K/UL (ref 4.5–13.5)

## 2025-02-25 PROCEDURE — 99285 EMERGENCY DEPT VISIT HI MDM: CPT

## 2025-02-25 PROCEDURE — 71045 X-RAY EXAM CHEST 1 VIEW: CPT

## 2025-02-25 PROCEDURE — 93005 ELECTROCARDIOGRAM TRACING: CPT | Performed by: EMERGENCY MEDICINE

## 2025-02-25 PROCEDURE — 85025 COMPLETE CBC W/AUTO DIFF WBC: CPT

## 2025-02-25 PROCEDURE — 84484 ASSAY OF TROPONIN QUANT: CPT

## 2025-02-25 PROCEDURE — 36415 COLL VENOUS BLD VENIPUNCTURE: CPT

## 2025-02-25 PROCEDURE — 83690 ASSAY OF LIPASE: CPT

## 2025-02-25 PROCEDURE — 80053 COMPREHEN METABOLIC PANEL: CPT

## 2025-02-25 PROCEDURE — 84703 CHORIONIC GONADOTROPIN ASSAY: CPT

## 2025-02-25 ASSESSMENT — PAIN DESCRIPTION - DESCRIPTORS: DESCRIPTORS: ACHING

## 2025-02-25 ASSESSMENT — PAIN DESCRIPTION - ORIENTATION: ORIENTATION: RIGHT

## 2025-02-25 ASSESSMENT — PAIN SCALES - GENERAL: PAINLEVEL_OUTOF10: 6

## 2025-02-25 ASSESSMENT — PAIN - FUNCTIONAL ASSESSMENT: PAIN_FUNCTIONAL_ASSESSMENT: 0-10

## 2025-02-25 ASSESSMENT — PAIN DESCRIPTION - PAIN TYPE: TYPE: ACUTE PAIN

## 2025-02-25 ASSESSMENT — PAIN DESCRIPTION - LOCATION: LOCATION: CHEST;SHOULDER

## 2025-02-25 ASSESSMENT — PAIN DESCRIPTION - FREQUENCY: FREQUENCY: CONTINUOUS

## 2025-02-25 NOTE — ED NOTES
Pt presents to the ED via private auto. Pt states she has been experiencing diffuse chest pain that radiates to her right shoulder x1 day. Pt took an antacid earlier this evening without relief

## 2025-02-25 NOTE — ED PROVIDER NOTES
[facility]  [address] [address]  Phone: [phone]      Patient Name:  Magdalena Andre  Medical Record Number:  [id number]  YOB: 2005  Date of Service:  2/25/2025  Primary Care Physician:  Hermila Bertrand PA-C      CHIEF COMPLAINT:       Chief Complaint   Patient presents with    Chest Pain     X12 hours       HISTORY OF PRESENT ILLNESS:   Magdalena Andre is a 19 y.o. female who presents with the complaint of chest pain.  The patient reports that starting around 1 PM this afternoon she developed a dull, achy, nonradiating, chest pain.  She states that 2 days ago she had been vomiting after drinking too much alcohol.  She took Tums without much improvement.  The patient has not tried any other medications and does not list any other provoking or palliating factors.  She denies any personal or family history of cardiac disease or blood clots.  She denies fever, chills, headache, vision changes, neck pain, back pain, shortness of breath, abdominal pain, urinary/bowel symptoms, diaphoresis, nausea, vomiting, focal weakness, numbness, tingling, dizziness, lightheadedness, syncope, recent travel, recent surgery, hemoptysis, estrogen use, calf tenderness or swelling, recent injury or illness.    CURRENT MEDICATIONS:      Discharge Medication List as of 2/25/2025  1:54 AM        CONTINUE these medications which have NOT CHANGED    Details   SUMAtriptan (IMITREX) 50 MG tablet Take 1 tablet by mouth See Admin Instructions, Disp-9 tablet, R-11Normal             ALLERGIES:   is allergic to red dye #40 (allura red).    PAST MEDICAL HISTORY:    has a past medical history of Autism spectrum disorder and Mirena IUD placed 4/6/23.    SURGICAL HISTORY:      has a past surgical history that includes Tonsillectomy and adenoidectomy (Bilateral, 2015).    FAMILY HISTORY:   She indicated that her mother is alive. She indicated that her father is alive. She indicated that her sister is

## 2025-03-12 ENCOUNTER — HOSPITAL ENCOUNTER (EMERGENCY)
Facility: CLINIC | Age: 20
Discharge: HOME | End: 2025-03-12
Attending: STUDENT IN AN ORGANIZED HEALTH CARE EDUCATION/TRAINING PROGRAM
Payer: OTHER GOVERNMENT

## 2025-03-12 VITALS
OXYGEN SATURATION: 100 % | RESPIRATION RATE: 17 BRPM | BODY MASS INDEX: 21.97 KG/M2 | SYSTOLIC BLOOD PRESSURE: 114 MMHG | HEART RATE: 62 BPM | WEIGHT: 140 LBS | HEIGHT: 67 IN | DIASTOLIC BLOOD PRESSURE: 63 MMHG | TEMPERATURE: 97.8 F

## 2025-03-12 DIAGNOSIS — S09.90XA CLOSED HEAD INJURY, INITIAL ENCOUNTER: Primary | ICD-10-CM

## 2025-03-12 PROCEDURE — 99283 EMERGENCY DEPT VISIT LOW MDM: CPT

## 2025-03-12 PROCEDURE — 6370000000 HC RX 637 (ALT 250 FOR IP): Performed by: STUDENT IN AN ORGANIZED HEALTH CARE EDUCATION/TRAINING PROGRAM

## 2025-03-12 RX ORDER — ACETAMINOPHEN 325 MG/1
650 TABLET ORAL ONCE
Status: COMPLETED | OUTPATIENT
Start: 2025-03-12 | End: 2025-03-12

## 2025-03-12 RX ADMIN — ACETAMINOPHEN 650 MG: 325 TABLET ORAL at 09:01

## 2025-03-12 ASSESSMENT — ENCOUNTER SYMPTOMS: VOMITING: 0

## 2025-03-12 NOTE — ED PROVIDER NOTES
[facility]  [address] [address]  Phone: [phone]        [facility] EMERGENCY DEPARTMENT  EMERGENCY DEPARTMENT ENCOUNTER      Pt Name: Magdalena Andre  MRN: [id number]  Birthdate 2005  Date of evaluation: 3/12/2025  Provider: Debbi Russ DO    CHIEF COMPLAINT       Chief Complaint   Patient presents with    Head Injury     Pt states she was putting a bed together and it hit her in the head. Pt c/o dizziness.       HISTORY OF PRESENT ILLNESS   (Location/Symptom, Timing/Onset,Context/Setting, Quality, Duration, Modifying Factors, Severity)  Note limiting factors.     Magdalena Andre is a 19 y.o. female who presents to the emergency department with concern for head injury.  Patient states she was putting together a bed last night, states there was a kavon-size bed, there was a headboard made of wood covered and cushion that somehow tipped over and hit her on the top of the head.  Patient states that she thought she might of blacked out for a second but realize she was awake the whole time.  No loss of consciousness.  Patient states she was able to continue putting together the bed.  When she was going to bed she noted that she was dizzy.  This morning she woke up and had a headache and was still feeling dizzy so wanted to be evaluated.  No blood thinners or bleeding disorders.  No previous head injuries.  No vomiting.  Has not yet tried anything for symptoms.    Nursing Notes were reviewed.    REVIEW OF SYSTEMS       Review of Systems   Gastrointestinal:  Negative for vomiting.   Skin:  Negative for rash and wound.   Neurological:  Positive for dizziness and headaches.   Psychiatric/Behavioral:  Negative for confusion.        PAST MEDICAL HISTORY     Past Medical History:   Diagnosis Date    Autism spectrum disorder     Mirena IUD placed 4/6/23 4/6/2023       SURGICAL HISTORY       Past Surgical History:   Procedure Laterality Date    TONSILLECTOMY AND

## 2025-03-12 NOTE — DISCHARGE INSTRUCTIONS
We evaluated you for your symptoms.  You most likely have a concussion.    Follow close with your primary doctor.  Return to the emergency department you develop any worsening or concerning symptoms including severe headaches or vomiting.

## 2025-03-18 ENCOUNTER — PROCEDURE VISIT (OUTPATIENT)
Dept: OBGYN CLINIC | Age: 20
End: 2025-03-18
Payer: OTHER GOVERNMENT

## 2025-03-18 VITALS
BODY MASS INDEX: 21.99 KG/M2 | SYSTOLIC BLOOD PRESSURE: 104 MMHG | WEIGHT: 140.1 LBS | HEIGHT: 67 IN | DIASTOLIC BLOOD PRESSURE: 62 MMHG

## 2025-03-18 DIAGNOSIS — Z30.432 ENCOUNTER FOR IUD REMOVAL: Primary | ICD-10-CM

## 2025-03-18 PROBLEM — Z97.5 IUD (INTRAUTERINE DEVICE) IN PLACE: Status: RESOLVED | Noted: 2023-04-06 | Resolved: 2025-03-18

## 2025-03-18 PROCEDURE — 58301 REMOVE INTRAUTERINE DEVICE: CPT | Performed by: ADVANCED PRACTICE MIDWIFE

## 2025-03-18 NOTE — PROGRESS NOTES
Patient is here for IUD removal.   IUD placed 4/6/23.   
control. Not actively TTC.   She will return for annual in September.   Reviewed nothing in the vagina for 24 hours  Reviewed may return to ovulation as soon as 2 weeks. Monitor cycles and report concerns.     Total time of procedure: 30 minutes  Typical time of procedure: 15 minutes   Additional work required: tenaculum/sound usage and Alligator forceps      Return in about 6 months (around 9/18/2025) for Annual exam.    The patient, Magdalena Andre,  was seen with a total time spent of 35 minutes for the visit on this date [facility] service [facility]  The time component involved both face-to-face (counseling and education) and non face-to-face time (care coordination), spent in determining the total time component.

## 2025-04-18 ENCOUNTER — HOSPITAL ENCOUNTER (EMERGENCY)
Facility: CLINIC | Age: 20
Discharge: HOME | End: 2025-04-18
Attending: EMERGENCY MEDICINE
Payer: OTHER GOVERNMENT

## 2025-04-18 VITALS
RESPIRATION RATE: 15 BRPM | HEIGHT: 68 IN | BODY MASS INDEX: 19.7 KG/M2 | HEART RATE: 85 BPM | SYSTOLIC BLOOD PRESSURE: 118 MMHG | TEMPERATURE: 97.4 F | WEIGHT: 130 LBS | OXYGEN SATURATION: 98 % | DIASTOLIC BLOOD PRESSURE: 73 MMHG

## 2025-04-18 DIAGNOSIS — N39.0 ACUTE UTI (URINARY TRACT INFECTION): Primary | ICD-10-CM

## 2025-04-18 LAB
BACTERIA URNS QL MICRO: ABNORMAL
BILIRUB UR QL STRIP: NEGATIVE
CHARACTER UR: ABNORMAL
CLARITY UR: ABNORMAL
COLOR UR: YELLOW
EPI CELLS #/AREA URNS HPF: ABNORMAL /HPF (ref 0–5)
GLUCOSE UR STRIP-MCNC: NEGATIVE MG/DL
HCG UR QL: NEGATIVE
HGB UR QL STRIP.AUTO: ABNORMAL
KETONES UR STRIP-MCNC: NEGATIVE MG/DL
LEUKOCYTE ESTERASE UR QL STRIP: ABNORMAL
NITRITE UR QL STRIP: NEGATIVE
PH UR STRIP: 6.5 [PH] (ref 5–8)
PROT UR STRIP-MCNC: NEGATIVE MG/DL
RBC #/AREA URNS HPF: ABNORMAL /HPF (ref 0–2)
SP GR UR STRIP: 1.01 (ref 1–1.03)
UROBILINOGEN UR STRIP-ACNC: NORMAL EU/DL (ref 0–1)
WBC #/AREA URNS HPF: ABNORMAL /HPF (ref 0–5)

## 2025-04-18 PROCEDURE — 87186 SC STD MICRODIL/AGAR DIL: CPT

## 2025-04-18 PROCEDURE — 87086 URINE CULTURE/COLONY COUNT: CPT

## 2025-04-18 PROCEDURE — 87088 URINE BACTERIA CULTURE: CPT

## 2025-04-18 PROCEDURE — 81001 URINALYSIS AUTO W/SCOPE: CPT

## 2025-04-18 PROCEDURE — 81025 URINE PREGNANCY TEST: CPT

## 2025-04-18 PROCEDURE — 99283 EMERGENCY DEPT VISIT LOW MDM: CPT

## 2025-04-18 PROCEDURE — 6370000000 HC RX 637 (ALT 250 FOR IP): Performed by: EMERGENCY MEDICINE

## 2025-04-18 RX ORDER — CEPHALEXIN 500 MG/1
500 CAPSULE ORAL ONCE
Status: COMPLETED | OUTPATIENT
Start: 2025-04-18 | End: 2025-04-18

## 2025-04-18 RX ORDER — CEPHALEXIN 500 MG/1
500 CAPSULE ORAL 2 TIMES DAILY
Qty: 14 CAPSULE | Refills: 0 | Status: SHIPPED | OUTPATIENT
Start: 2025-04-18 | End: 2025-04-25

## 2025-04-18 RX ADMIN — CEPHALEXIN 500 MG: 500 CAPSULE ORAL at 23:17

## 2025-04-18 ASSESSMENT — PAIN DESCRIPTION - FREQUENCY: FREQUENCY: INTERMITTENT

## 2025-04-18 ASSESSMENT — PAIN DESCRIPTION - DESCRIPTORS: DESCRIPTORS: BURNING

## 2025-04-18 ASSESSMENT — PAIN SCALES - GENERAL: PAINLEVEL_OUTOF10: 10

## 2025-04-18 ASSESSMENT — PAIN - FUNCTIONAL ASSESSMENT: PAIN_FUNCTIONAL_ASSESSMENT: 0-10

## 2025-04-18 ASSESSMENT — PAIN DESCRIPTION - LOCATION: LOCATION: PERINEUM

## 2025-04-18 ASSESSMENT — PAIN DESCRIPTION - PAIN TYPE: TYPE: ACUTE PAIN

## 2025-04-19 NOTE — ED NOTES
Pt presents to the ED via private auto. Pt states she has been experiencing burning with urination x1 day. Pt states she typically experiences UTI symptoms when she is first pregnant. Pt took 2 doses of AZO for dysuria.

## 2025-04-19 NOTE — ED PROVIDER NOTES
[facility]  [address] [address]  Phone: [phone]      Patient Name:  Magdalena Andre  Medical Record Number:  [id number]  YOB: 2005  Date of Service:  2025  Primary Care Physician:  Hermila Bertrand PA-C      CHIEF COMPLAINT:       Chief Complaint   Patient presents with    Dysuria     X1 day       HISTORY OF PRESENT ILLNESS:    Magdalena Andre is a 19 y.o. female who presents with the complaint of dysuria.  Does have been going on since yesterday.  She did take 2 doses of Azo with little relief.  Patient states that she did go running for a long time in the heat today and felt a little lightheaded.  She denies any abdominal pain or vomiting or flank pain.    CURRENT MEDICATIONS:      Previous Medications    SUMATRIPTAN (IMITREX) 50 MG TABLET    Take 1 tablet by mouth See Admin Instructions       ALLERGIES:   is allergic to red dye #40 (allura red).    PAST MEDICAL HISTORY:    has a past medical history of Autism spectrum disorder.    SURGICAL HISTORY:      has a past surgical history that includes Tonsillectomy and adenoidectomy (Bilateral, 2015).    FAMILY HISTORY:   She indicated that her mother is alive. She indicated that her father is alive. She indicated that her sister is alive. She indicated that her brother is alive. She indicated that her maternal grandmother is alive. She indicated that her maternal grandfather is . She indicated that her paternal grandmother is alive. She indicated that her paternal grandfather is alive. She indicated that her paternal aunt is alive.     family history includes COPD in her maternal grandfather; Cancer in her maternal grandfather; Cervical Cancer in her paternal aunt; Ovarian Cancer in her mother.    SOCIAL HISTORY:     reports that she has never smoked. She has never used smokeless tobacco. She reports that she does not currently use alcohol. She reports that she does not use drugs.    IMMUNIZATION

## 2025-04-20 LAB
MICROORGANISM SPEC CULT: ABNORMAL
SPECIMEN DESCRIPTION: ABNORMAL

## 2025-04-23 ENCOUNTER — RESULTS FOLLOW-UP (OUTPATIENT)
Dept: EMERGENCY DEPT | Facility: CLINIC | Age: 20
End: 2025-04-23

## 2025-06-06 ENCOUNTER — HOSPITAL ENCOUNTER (OUTPATIENT)
Facility: CLINIC | Age: 20
Discharge: HOME | End: 2025-06-06
Payer: OTHER GOVERNMENT

## 2025-06-06 ENCOUNTER — PATIENT MESSAGE (OUTPATIENT)
Dept: OBGYN CLINIC | Age: 20
End: 2025-06-06

## 2025-06-06 ENCOUNTER — TELEPHONE (OUTPATIENT)
Dept: OBGYN CLINIC | Age: 20
End: 2025-06-06

## 2025-06-06 DIAGNOSIS — N92.6 MISSED MENSES: Primary | ICD-10-CM

## 2025-06-06 DIAGNOSIS — N92.6 MISSED MENSES: ICD-10-CM

## 2025-06-06 LAB — B-HCG SERPL EIA 3RD IS-ACNC: <0.2 MIU/ML

## 2025-06-06 PROCEDURE — 84702 CHORIONIC GONADOTROPIN TEST: CPT

## 2025-06-06 PROCEDURE — 36415 COLL VENOUS BLD VENIPUNCTURE: CPT

## 2025-06-06 NOTE — TELEPHONE ENCOUNTER
Magdalena Andre calling reporting a positive pregnancy test.    Magdalena Andre is  a new patient.   If no: previous OBGYN [facility]  This is not Magdalena Andre's first pregnancy.    ** Please complete if patient has previous birth history, please delete is not applicable**  Number of pregnancies: 1  Mode of delivery (vaginal//both) VAG  If NEW patient please instruct patient will need prior OBGYN records.     Magdalena Andre last menstrual cycle: 5/  Based on LMP patient is 4W4D weeks     Dose patient have any concerns?   []YES  [x]NO  If yes, please discuss concern with provider to determine if patient needs additional appointment.      Scheduling Instructions and Education  [x]If patient less than 8 weeks please schedule missed menses (30 mins) between 6-8 weeks. ALSO scheduled USN w/ IPV (w/ NURSE) to follow 2-3 weeks after missed menses   Patient education: Initial missed menses appointment will consist of a pregnancy test and to establish care and review previous births **if applicable**. There will NOT be an ultrasound at this first visit. Please review that the USN and IPV visit will be 2-3 weeks after patient's missed menses. At this appointment dating ultrasound will be complete, prenatal labs ordered, prenatal education completed, pelvic   Please document appointments scheduled during phone call   Missed menses date/provider:  2:00 CODY  USN/IPV date:USN  12:00, IPV 12:15    Please forward telephone encounter to provider appointments are scheduled with prior to closing telephone encounter.

## 2025-06-11 ENCOUNTER — OFFICE VISIT (OUTPATIENT)
Dept: OBGYN CLINIC | Age: 20
End: 2025-06-11
Payer: OTHER GOVERNMENT

## 2025-06-11 ENCOUNTER — HOSPITAL ENCOUNTER (OUTPATIENT)
Age: 20
Setting detail: SPECIMEN
Discharge: HOME | End: 2025-06-11

## 2025-06-11 VITALS — SYSTOLIC BLOOD PRESSURE: 112 MMHG | DIASTOLIC BLOOD PRESSURE: 72 MMHG

## 2025-06-11 DIAGNOSIS — N89.8 VAGINAL ODOR: ICD-10-CM

## 2025-06-11 DIAGNOSIS — N89.8 VAGINAL ODOR: Primary | ICD-10-CM

## 2025-06-11 PROCEDURE — 99214 OFFICE O/P EST MOD 30 MIN: CPT

## 2025-06-11 ASSESSMENT — ENCOUNTER SYMPTOMS
GASTROINTESTINAL NEGATIVE: 1
RESPIRATORY NEGATIVE: 1

## 2025-06-12 ENCOUNTER — RESULTS FOLLOW-UP (OUTPATIENT)
Dept: OBGYN CLINIC | Age: 20
End: 2025-06-12

## 2025-06-12 DIAGNOSIS — N76.0 ACUTE VAGINITIS: Primary | ICD-10-CM

## 2025-06-12 LAB
CANDIDA SPECIES: POSITIVE
GARDNERELLA VAGINALIS: POSITIVE
SOURCE: ABNORMAL
TRICHOMONAS: NEGATIVE

## 2025-06-12 RX ORDER — TERCONAZOLE 4 MG/G
CREAM VAGINAL
Qty: 45 G | Refills: 0 | Status: SHIPPED | OUTPATIENT
Start: 2025-06-12 | End: 2025-06-19

## 2025-06-12 RX ORDER — METRONIDAZOLE 500 MG/1
500 TABLET ORAL 2 TIMES DAILY
Qty: 14 TABLET | Refills: 0 | Status: SHIPPED | OUTPATIENT
Start: 2025-06-12 | End: 2025-06-19